# Patient Record
Sex: FEMALE | URBAN - METROPOLITAN AREA
[De-identification: names, ages, dates, MRNs, and addresses within clinical notes are randomized per-mention and may not be internally consistent; named-entity substitution may affect disease eponyms.]

---

## 2018-08-06 ENCOUNTER — OFFICE VISIT (OUTPATIENT)
Dept: URBAN - METROPOLITAN AREA CLINIC 33 | Facility: CLINIC | Age: 73
End: 2018-08-06
Payer: COMMERCIAL

## 2018-08-06 PROCEDURE — 99213 OFFICE O/P EST LOW 20 MIN: CPT | Performed by: OPHTHALMOLOGY

## 2018-08-06 RX ORDER — LATANOPROST 50 UG/ML
0.005 % SOLUTION OPHTHALMIC
Qty: 3 | Refills: 0 | Status: INACTIVE
Start: 2018-08-06 | End: 2018-08-06

## 2018-08-06 RX ORDER — LATANOPROST 50 UG/ML
0.005 % SOLUTION OPHTHALMIC
Qty: 3 | Refills: 11 | Status: INACTIVE
Start: 2018-08-06 | End: 2018-10-30

## 2018-08-06 ASSESSMENT — INTRAOCULAR PRESSURE
OD: 14
OS: 13

## 2018-08-06 NOTE — IMPRESSION/PLAN
Impression: Primary open-angle glaucoma, bilateral, moderate stage: T46.1440. /544, 2/18 OCT 68/64 8/7, 3/18 HVF OD: RICARDO OS: Dense SA, 3/18 Disc photos taken, S/p SLT OU 2015
(azopt ineffective, lumigan too expensive) Plan: IOP improved. Goal IOP likely low teens OU. Recommend pt continue Timolol OU BID and latanoprost OU QHS. Ok for refraction PRN.

## 2018-08-09 ENCOUNTER — OFFICE VISIT (OUTPATIENT)
Dept: URBAN - METROPOLITAN AREA CLINIC 33 | Facility: CLINIC | Age: 73
End: 2018-08-09
Payer: COMMERCIAL

## 2018-08-09 DIAGNOSIS — H52.4 PRESBYOPIA: Primary | ICD-10-CM

## 2018-08-09 PROCEDURE — 92015 DETERMINE REFRACTIVE STATE: CPT | Performed by: OPTOMETRIST

## 2018-08-09 ASSESSMENT — VISUAL ACUITY
OS: 20/25
OD: 20/25

## 2018-12-06 ENCOUNTER — OFFICE VISIT (OUTPATIENT)
Dept: URBAN - METROPOLITAN AREA CLINIC 33 | Facility: CLINIC | Age: 73
End: 2018-12-06
Payer: COMMERCIAL

## 2018-12-06 DIAGNOSIS — H40.1132 PRIMARY OPEN-ANGLE GLAUCOMA, BILATERAL, MODERATE STAGE: Primary | ICD-10-CM

## 2018-12-06 PROCEDURE — 99213 OFFICE O/P EST LOW 20 MIN: CPT | Performed by: OPTOMETRIST

## 2018-12-06 PROCEDURE — 99202 OFFICE O/P NEW SF 15 MIN: CPT | Performed by: OPTOMETRIST

## 2018-12-06 ASSESSMENT — INTRAOCULAR PRESSURE
OS: 15
OD: 15

## 2018-12-06 NOTE — IMPRESSION/PLAN
Impression: Primary open-angle glaucoma, bilateral, moderate stage: S90.9343. /544, 2/18 OCT 68/62 8/7, 3/18 HVF OD: RICARDO OS: Dense SA, 3/18 Disc photos taken, S/p SLT OU 2015
(azopt ineffective, lumigan too expensive)  IOP up a little Plan: IOP improved. Goal IOP likely low teens OU. Recommend pt continue Timolol OU BID and latanoprost OU QHS. 

orig IOP 19/19, Marcial IOP 14/13, VF OU sup arc/rehana

## 2019-02-06 ENCOUNTER — TESTING ONLY (OUTPATIENT)
Dept: URBAN - METROPOLITAN AREA CLINIC 33 | Facility: CLINIC | Age: 74
End: 2019-02-06
Payer: MEDICARE

## 2019-02-06 PROCEDURE — 92083 EXTENDED VISUAL FIELD XM: CPT | Performed by: OPTOMETRIST

## 2019-02-14 ENCOUNTER — OFFICE VISIT (OUTPATIENT)
Dept: URBAN - METROPOLITAN AREA CLINIC 33 | Facility: CLINIC | Age: 74
End: 2019-02-14
Payer: MEDICARE

## 2019-02-14 PROCEDURE — 92133 CPTRZD OPH DX IMG PST SGM ON: CPT | Performed by: OPTOMETRIST

## 2019-02-14 PROCEDURE — 99214 OFFICE O/P EST MOD 30 MIN: CPT | Performed by: OPTOMETRIST

## 2019-02-14 ASSESSMENT — INTRAOCULAR PRESSURE
OS: 15
OD: 15

## 2019-02-14 NOTE — IMPRESSION/PLAN
Impression: Primary open-angle glaucoma, bilateral, moderate stage: P12.6627. /544, 2/18 OCT 68/62 8/7, 3/18 HVF OD: RICARDO OS: Dense SA, 3/18 Disc photos taken, S/p SLT OU 2015
(azopt ineffective, lumigan too expensive)   stable IOP OU Plan: Goal IOP likely low teens OU. Recommend pt continue Timolol OU BID and latanoprost OU QHS. 

orig IOP 19/19, Marcial IOP 14/13, OCT 68/60(68/62), VF OD sup arc/rehana OS sup arc/rehana BJW minimal changes OU

## 2019-05-23 ENCOUNTER — OFFICE VISIT (OUTPATIENT)
Dept: URBAN - METROPOLITAN AREA CLINIC 33 | Facility: CLINIC | Age: 74
End: 2019-05-23
Payer: MEDICARE

## 2019-05-23 PROCEDURE — 99213 OFFICE O/P EST LOW 20 MIN: CPT | Performed by: OPTOMETRIST

## 2019-05-23 ASSESSMENT — INTRAOCULAR PRESSURE
OS: 15
OD: 15

## 2019-05-23 NOTE — IMPRESSION/PLAN
Impression: Primary open-angle glaucoma, bilateral, moderate stage: X11.7337. /544, 2/18 OCT 68/62 8/7, 3/18 HVF OD: RICARDO OS: Dense SA, 3/18 Disc photos taken, S/p SLT OU 2015
(azopt ineffective, lumigan too expensive)  IOP stable OU Plan: Goal IOP likely low teens OU. Recommend pt continue Timolol OU BID and latanoprost OU QHS. 

orig IOP 19/19, Marcial IOP 14/13, OCT 68/60(68/62), VF OD sup arc/rehana OS sup arc/rehana BJW minimal changes OU

## 2019-08-29 ENCOUNTER — OFFICE VISIT (OUTPATIENT)
Dept: URBAN - METROPOLITAN AREA CLINIC 33 | Facility: CLINIC | Age: 74
End: 2019-08-29
Payer: MEDICARE

## 2019-08-29 PROCEDURE — 99213 OFFICE O/P EST LOW 20 MIN: CPT | Performed by: OPTOMETRIST

## 2019-08-29 ASSESSMENT — INTRAOCULAR PRESSURE
OD: 16
OS: 15

## 2019-08-29 NOTE — IMPRESSION/PLAN
Impression: Primary open-angle glaucoma, bilateral, moderate stage: C60.0467. /544, 2/18 OCT 68/62 8/7, 3/18 HVF OD: RICARDO OS: Dense SA, 3/18 Disc photos taken, S/p SLT OU 2015
(azopt ineffective, lumigan too expensive) stable IOP OU Plan: Goal IOP likely low teens OU. Recommend pt continue Timolol OU BID and latanoprost OU QHS. 

orig IOP 19/19, Marcial IOP 14/13, OCT 68/60(68/62), VF OD sup arc/rehana OS sup arc/rehana BJW minimal changes OU

## 2019-11-21 ENCOUNTER — OFFICE VISIT (OUTPATIENT)
Dept: URBAN - METROPOLITAN AREA CLINIC 33 | Facility: CLINIC | Age: 74
End: 2019-11-21
Payer: MEDICARE

## 2019-11-21 PROCEDURE — 99213 OFFICE O/P EST LOW 20 MIN: CPT | Performed by: OPTOMETRIST

## 2019-11-21 ASSESSMENT — INTRAOCULAR PRESSURE
OS: 15
OD: 18

## 2019-11-21 NOTE — IMPRESSION/PLAN
Impression: Primary open-angle glaucoma, bilateral, moderate stage: P27.8773. /544, 2/18 OCT 68/62 8/7, 3/18 HVF OD: RICARDO OS: Dense SA, 3/18 Disc photos taken, S/p SLT OU 2015
(azopt ineffective, lumigan too expensive) IOP up OD Plan: Goal IOP likely low teens OU. Recommend pt continue Timolol OU BID and latanoprost OU QHS. 

orig IOP 19/19, Marcial IOP 14/13, OCT 68/60(68/62), VF OD sup arc/rehana OS sup arc/rehana BJW minimal changes OU

## 2019-12-19 ENCOUNTER — OFFICE VISIT (OUTPATIENT)
Dept: URBAN - METROPOLITAN AREA CLINIC 33 | Facility: CLINIC | Age: 74
End: 2019-12-19
Payer: MEDICARE

## 2019-12-19 PROCEDURE — 92012 INTRM OPH EXAM EST PATIENT: CPT | Performed by: OPTOMETRIST

## 2019-12-19 RX ORDER — LATANOPROST 50 UG/ML
0.005 % SOLUTION OPHTHALMIC
Qty: 3 | Refills: 3 | Status: INACTIVE
Start: 2019-12-19 | End: 2019-12-20

## 2019-12-19 ASSESSMENT — INTRAOCULAR PRESSURE
OS: 16
OD: 18

## 2019-12-19 NOTE — IMPRESSION/PLAN
Impression: Primary open-angle glaucoma, bilateral, moderate stage: M85.5395. /544, 2/18 OCT 68/62 8/7, 3/18 HVF OD: RICARDO OS: Dense SA, 3/18 Disc photos taken, S/p SLT OU 2015
(azopt ineffective, lumigan too expensive) IOP still up OD > OS Plan: Goal IOP likely low teens OU. Recommend pt stop Timolol OU BID, continue latanoprost OU QHS.  Start Alphagan P q12h OU

orig IOP 19/19, Marcial IOP 14/13, OCT 68/60(68/62), VF OD sup arc/rehana OS sup arc/rehana BJW minimal changes OU

## 2024-10-17 ENCOUNTER — OFFICE VISIT (OUTPATIENT)
Dept: URBAN - METROPOLITAN AREA CLINIC 35 | Facility: CLINIC | Age: 79
End: 2024-10-17
Payer: COMMERCIAL

## 2024-10-17 DIAGNOSIS — H40.9 UNSPECIFIED GLAUCOMA: ICD-10-CM

## 2024-10-17 DIAGNOSIS — H35.81 RETINAL EDEMA: Primary | ICD-10-CM

## 2024-10-17 PROCEDURE — 99204 OFFICE O/P NEW MOD 45 MIN: CPT | Performed by: OPHTHALMOLOGY

## 2024-10-17 PROCEDURE — 92134 CPTRZ OPH DX IMG PST SGM RTA: CPT | Performed by: OPHTHALMOLOGY

## 2024-10-17 RX ORDER — KETOROLAC TROMETHAMINE 5 MG/ML
0.5 % SOLUTION OPHTHALMIC
Qty: 5 | Refills: 0 | Status: INACTIVE
Start: 2024-10-17 | End: 2025-01-01

## 2024-10-17 RX ORDER — PREDNISOLONE ACETATE 10 MG/ML
1 % SUSPENSION/ DROPS OPHTHALMIC
Qty: 5 | Refills: 3 | Status: INACTIVE
Start: 2024-10-17 | End: 2025-01-01

## 2024-10-17 ASSESSMENT — INTRAOCULAR PRESSURE
OD: 18
OS: 15

## 2024-11-21 ENCOUNTER — OFFICE VISIT (OUTPATIENT)
Dept: URBAN - METROPOLITAN AREA CLINIC 35 | Facility: CLINIC | Age: 79
End: 2024-11-21
Payer: COMMERCIAL

## 2024-11-21 DIAGNOSIS — H40.9 UNSPECIFIED GLAUCOMA: ICD-10-CM

## 2024-11-21 DIAGNOSIS — H35.81 RETINAL EDEMA: Primary | ICD-10-CM

## 2024-11-21 PROCEDURE — 92134 CPTRZ OPH DX IMG PST SGM RTA: CPT | Performed by: OPHTHALMOLOGY

## 2024-11-21 PROCEDURE — 99213 OFFICE O/P EST LOW 20 MIN: CPT | Performed by: OPHTHALMOLOGY

## 2024-11-21 ASSESSMENT — INTRAOCULAR PRESSURE
OD: 20
OS: 15

## 2024-12-26 ENCOUNTER — OFFICE VISIT (OUTPATIENT)
Dept: URBAN - METROPOLITAN AREA CLINIC 35 | Facility: CLINIC | Age: 79
End: 2024-12-26
Payer: COMMERCIAL

## 2024-12-26 DIAGNOSIS — H40.9 UNSPECIFIED GLAUCOMA: ICD-10-CM

## 2024-12-26 DIAGNOSIS — H35.81 RETINAL EDEMA: Primary | ICD-10-CM

## 2024-12-26 PROCEDURE — 92134 CPTRZ OPH DX IMG PST SGM RTA: CPT | Performed by: OPHTHALMOLOGY

## 2024-12-26 PROCEDURE — 99213 OFFICE O/P EST LOW 20 MIN: CPT | Performed by: OPHTHALMOLOGY

## 2024-12-26 ASSESSMENT — INTRAOCULAR PRESSURE
OD: 18
OS: 19